# Patient Record
Sex: FEMALE | Race: WHITE | Employment: STUDENT | URBAN - NONMETROPOLITAN AREA
[De-identification: names, ages, dates, MRNs, and addresses within clinical notes are randomized per-mention and may not be internally consistent; named-entity substitution may affect disease eponyms.]

---
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
## 2024-01-12 DIAGNOSIS — R41.840 INATTENTION: ICD-10-CM

## 2024-01-12 DIAGNOSIS — F90.2 ATTENTION DEFICIT HYPERACTIVITY DISORDER, COMBINED TYPE: ICD-10-CM

## 2024-01-12 DIAGNOSIS — F41.1 GAD (GENERALIZED ANXIETY DISORDER): ICD-10-CM

## 2024-01-12 RX ORDER — METHYLPHENIDATE HYDROCHLORIDE 27 MG/1
27 TABLET ORAL DAILY
Qty: 30 TABLET | Refills: 0 | Status: SHIPPED | OUTPATIENT
Start: 2024-01-12 | End: 2024-02-11

## 2024-01-12 NOTE — TELEPHONE ENCOUNTER
Concerta was sent in for patient  Is the higher dose helping?  Has she heard anything from psychology referral I placed at last visit?

## 2024-01-12 NOTE — TELEPHONE ENCOUNTER
Recent Visits  Date Type Provider Dept   12/08/23 Office Visit Elizabeth Mixon, APRN - CNP [facility]   09/08/23 Office Visit Elizabeth Mixon, APRN - CNP [facility]   07/11/23 Office Visit Elizabeth Mixon, APRN - CNP [facility]   06/02/23 Office Visit Elizabeth Mixon, APRN - CNP [facility]   05/05/23 Office Visit Elizabeth Mixon, APRN - CNP [facility]   04/14/23 Office Visit Elizabeth Mixon, APRN - CNP [facility]   03/20/23 Office Visit Elizabeth Mixon, APRN - CNP [facility]   Showing recent visits within past 540 days with a meds authorizing provider and meeting all other requirements  Future Appointments  Date Type Provider Dept   03/11/24 Appointment Elizabeth Mixon APRN - CNP [facility]   Showing future appointments within next 150 days with a meds authorizing provider and meeting all other requirements

## 2024-02-05 DIAGNOSIS — F41.1 GAD (GENERALIZED ANXIETY DISORDER): ICD-10-CM

## 2024-02-05 RX ORDER — ESCITALOPRAM OXALATE 20 MG/1
20 TABLET ORAL DAILY
Qty: 30 TABLET | Refills: 5 | Status: SHIPPED | OUTPATIENT
Start: 2024-02-05 | End: 2024-08-03

## 2024-02-05 NOTE — TELEPHONE ENCOUNTER
Future Appointments   Date Time Provider Department Center   3/11/2024  4:00 PM Elizabeth Mixon, BERTO - CNP [facility] [place]

## 2024-03-28 DIAGNOSIS — F90.2 ATTENTION DEFICIT HYPERACTIVITY DISORDER, COMBINED TYPE: ICD-10-CM

## 2024-03-28 DIAGNOSIS — R41.840 INATTENTION: ICD-10-CM

## 2024-03-28 DIAGNOSIS — F41.1 GAD (GENERALIZED ANXIETY DISORDER): ICD-10-CM

## 2024-03-29 RX ORDER — METHYLPHENIDATE HYDROCHLORIDE 27 MG/1
27 TABLET ORAL DAILY
Qty: 30 TABLET | Refills: 0 | Status: SHIPPED | OUTPATIENT
Start: 2024-03-29 | End: 2024-04-28

## 2024-03-29 NOTE — TELEPHONE ENCOUNTER
Recent Visits  Date Type Provider Dept   12/08/23 Office Visit Elizabeth Mixon, APRN - CNP [facility]   09/08/23 Office Visit Elizabeth Mixon, APRN - CNP [facility]   07/11/23 Office Visit Elizabeth Mixon, APRN - CNP [facility]   06/02/23 Office Visit Elizabeth Mixon, APRN [facility]   05/05/23 Office Visit Elizabeth Mixon, APRN - CNP [facility]   04/14/23 Office Visit Elizabeth Mixon, APRN - CNP [facility]   03/20/23 Office Visit Elizabeth Mixon, APRN - CNP [facility]   Showing recent visits within past 540 days with a meds authorizing provider and meeting all other requirements  Future Appointments  No visits were found meeting these conditions.  Showing future appointments within next 150 days with a meds authorizing provider and meeting all other requirements

## 2024-06-25 DIAGNOSIS — F90.2 ATTENTION DEFICIT HYPERACTIVITY DISORDER, COMBINED TYPE: ICD-10-CM

## 2024-06-25 DIAGNOSIS — F41.1 GAD (GENERALIZED ANXIETY DISORDER): ICD-10-CM

## 2024-06-25 DIAGNOSIS — R41.840 INATTENTION: ICD-10-CM

## 2024-06-25 RX ORDER — METHYLPHENIDATE HYDROCHLORIDE 27 MG/1
27 TABLET ORAL DAILY
Qty: 30 TABLET | Refills: 0 | Status: CANCELLED | OUTPATIENT
Start: 2024-06-25 | End: 2024-07-25

## 2024-06-25 NOTE — TELEPHONE ENCOUNTER
Recent Visits  Date Type Provider Dept   12/08/23 Office Visit Elizabeth Mixon, APRN - CNP [facility]   09/08/23 Office Visit Elizabeth Mixon, APRN - CNP [facility]   07/11/23 Office Visit Elizabeth Mixon, APRN - CNP [facility]   06/02/23 Office Visit Elizabeth Mixon, APRN [facility]   05/05/23 Office Visit Elizabeth Mixon, APRN - CNP [facility]   04/14/23 Office Visit Elizabeth Mixon, APRN - CNP [facility]   03/20/23 Office Visit Elizabeth Mixon, APRN - CNP [facility]   Showing recent visits within past 540 days with a meds authorizing provider and meeting all other requirements  Future Appointments  No visits were found meeting these conditions.  Showing future appointments within next 150 days with a meds authorizing provider and meeting all other requirements

## 2024-06-27 RX ORDER — METHYLPHENIDATE HYDROCHLORIDE 27 MG/1
27 TABLET ORAL DAILY
Qty: 30 TABLET | Refills: 0 | Status: SHIPPED | OUTPATIENT
Start: 2024-06-27 | End: 2024-07-27

## 2024-06-27 NOTE — TELEPHONE ENCOUNTER
Left message on answering machine. Requested pt to call back  at their earliest convenience.     I have been unable to reach this patient by phone.  A letter is being sent.

## 2024-06-27 NOTE — TELEPHONE ENCOUNTER
Recent Visits  Date Type Provider Dept   12/08/23 Office Visit Elizabeth Mixon, APRN [facility]   09/08/23 Office Visit Elizabeth Mixon, APRN - CNP [facility]   07/11/23 Office Visit Elizabeth Mixon, APRN [facility]   06/02/23 Office Visit Elizabeth Mixon, APRN [facility]   05/05/23 Office Visit Elizabeth Mixon, APRN [facility]   04/14/23 Office Visit Elizabeth Mixon, APRN [facility]   03/20/23 Office Visit Elizabeth Mixon, APRN - CNP [facility]   Showing recent visits within past 540 days with a meds authorizing provider and meeting all other requirements  Future Appointments  Date Type Provider Dept   07/22/24 Appointment Elizabeth Mixon, APRN [facility]   Showing future appointments within next 150 days with a meds authorizing provider and meeting all other requirements    Pt called back and stated that she is taking concerta. Just for got to refill it

## 2024-07-20 ASSESSMENT — PATIENT HEALTH QUESTIONNAIRE - PHQ9
1. LITTLE INTEREST OR PLEASURE IN DOING THINGS: NOT AT ALL
SUM OF ALL RESPONSES TO PHQ QUESTIONS 1-9: 11
2. FEELING DOWN, DEPRESSED OR HOPELESS: NOT AT ALL
7. TROUBLE CONCENTRATING ON THINGS, SUCH AS READING THE NEWSPAPER OR WATCHING TELEVISION: NEARLY EVERY DAY
4. FEELING TIRED OR HAVING LITTLE ENERGY: MORE THAN HALF THE DAYS
6. FEELING BAD ABOUT YOURSELF - OR THAT YOU ARE A FAILURE OR HAVE LET YOURSELF OR YOUR FAMILY DOWN: NOT AT ALL
10. IF YOU CHECKED OFF ANY PROBLEMS, HOW DIFFICULT HAVE THESE PROBLEMS MADE IT FOR YOU TO DO YOUR WORK, TAKE CARE OF THINGS AT HOME, OR GET ALONG WITH OTHER PEOPLE: VERY DIFFICULT
5. POOR APPETITE OR OVEREATING: SEVERAL DAYS
9. THOUGHTS THAT YOU WOULD BE BETTER OFF DEAD, OR OF HURTING YOURSELF: NOT AT ALL
3. TROUBLE FALLING OR STAYING ASLEEP: NEARLY EVERY DAY
8. MOVING OR SPEAKING SO SLOWLY THAT OTHER PEOPLE COULD HAVE NOTICED. OR THE OPPOSITE - BEING SO FIDGETY OR RESTLESS THAT YOU HAVE BEEN MOVING AROUND A LOT MORE THAN USUAL: MORE THAN HALF THE DAYS

## 2024-07-22 ENCOUNTER — OFFICE VISIT (OUTPATIENT)
Dept: FAMILY MEDICINE CLINIC | Age: 19
End: 2024-07-22
Payer: COMMERCIAL

## 2024-07-22 VITALS
SYSTOLIC BLOOD PRESSURE: 118 MMHG | HEART RATE: 83 BPM | BODY MASS INDEX: 20.79 KG/M2 | TEMPERATURE: 97.3 F | RESPIRATION RATE: 14 BRPM | OXYGEN SATURATION: 97 % | WEIGHT: 121.8 LBS | DIASTOLIC BLOOD PRESSURE: 68 MMHG | HEIGHT: 64 IN

## 2024-07-22 DIAGNOSIS — F41.1 GAD (GENERALIZED ANXIETY DISORDER): ICD-10-CM

## 2024-07-22 DIAGNOSIS — F90.2 ATTENTION DEFICIT HYPERACTIVITY DISORDER, COMBINED TYPE: ICD-10-CM

## 2024-07-22 DIAGNOSIS — Z00.00 PHYSICAL EXAM: Primary | ICD-10-CM

## 2024-07-22 PROCEDURE — 99395 PREV VISIT EST AGE 18-39: CPT | Performed by: NURSE PRACTITIONER

## 2024-07-22 RX ORDER — ESCITALOPRAM OXALATE 20 MG/1
20 TABLET ORAL DAILY
Qty: 90 TABLET | Refills: 3 | Status: SHIPPED | OUTPATIENT
Start: 2024-07-22 | End: 2025-07-17

## 2024-07-22 RX ORDER — METHYLPHENIDATE HYDROCHLORIDE 36 MG/1
36 TABLET ORAL DAILY
Qty: 30 TABLET | Refills: 0 | Status: SHIPPED | OUTPATIENT
Start: 2024-07-22 | End: 2024-08-21

## 2024-07-22 RX ORDER — METHYLPHENIDATE HYDROCHLORIDE 27 MG/1
27 TABLET ORAL EVERY MORNING
COMMUNITY
End: 2024-07-22

## 2024-07-22 NOTE — PROGRESS NOTES
Sally Garibay is a 19 y.o. female thatpresents for Annual Exam      History obtained today from Patient.    HPI    Diet: good  Sleep: hard to fall asleep  Moods: as below    ADHD    Was started on Concerta as she wasn't feeling any difference with Adderall  Quit her job recently and is living back with her parents around the holidays. Had new coworkers and manager.  Feels her anxiety is controlled with the Lexapro. Still with issues of inattention and not being able to stay on task, even at home. She reports being diagnosed with ADHD as a kid but not treated. Still not noting any different with Concerta. She is frustrated as she feels her memory and inability to focus are preventing her from moving on and getting a job or going back to school.      She has hx of previous Suicide attempt.   Has not recently seen mental specialist. Would like to establish with someone. She will be moving back to [facility] after holidays    This visit 7-    On Concerta  Kept forgetting to get it filled went without it from March to June  On 27 mg and not sure she is noticing any difference  Agreeable to increasing dose  Work is going good, not getting in trouble. Did forget to write up reports earlier this month but nothing major  Lives with her grandmother, having health issues and family is coming to stay so that is throwing her off her routine    Anxiety  This visit 7-  On Lexapro thinks its helping with anxiety  No SI/HI  Takes Hydroxyzine 25 mg at night, if she remembers  Trouble falling asleep       Depression  PHQ9 was 11 today  Continues on Lexapro  No SI/HI    She has hx of previous Suicide attempt.   Has not recently seen mental health specialist. Would like to establish with someone.       I have reviewed the patient's past medical history, past surgical history, allergies,medications, social and family history and I have made updates where appropriate.    Past Medical History:   Diagnosis Date    Major

## 2024-07-22 NOTE — PATIENT INSTRUCTIONS
Krysta Zelaya, PHD  Address: [address], [place], [place] [id number]  Phone: [phone]

## 2024-08-24 DIAGNOSIS — F90.2 ATTENTION DEFICIT HYPERACTIVITY DISORDER, COMBINED TYPE: ICD-10-CM

## 2024-08-24 DIAGNOSIS — F41.1 GAD (GENERALIZED ANXIETY DISORDER): ICD-10-CM

## 2024-08-26 RX ORDER — METHYLPHENIDATE HYDROCHLORIDE 36 MG/1
36 TABLET ORAL DAILY
Qty: 30 TABLET | Refills: 0 | Status: SHIPPED | OUTPATIENT
Start: 2024-08-26 | End: 2024-09-25

## 2024-08-26 NOTE — TELEPHONE ENCOUNTER
Recent Visits  Date Type Provider Dept   07/22/24 Office Visit Elizabeth Mixon, APRN - CNP [facility]   12/08/23 Office Visit Elizabeth Mixon, APRN - CNP [facility]   09/08/23 Office Visit Elizabeth Mixon, APRN - CNP [facility]   07/11/23 Office Visit Elizabeth Mixon, APRN - CNP [facility]   06/02/23 Office Visit Elizabeth Mixon, APRN - CNP [facility]   05/05/23 Office Visit Elizabeth Mixon, APRN - CNP [facility]   04/14/23 Office Visit Elizabeth Mixon, APRN - CNP [facility]   03/20/23 Office Visit Elizabeth Mixon, APRN - CNP [facility]   Showing recent visits within past 540 days with a meds authorizing provider and meeting all other requirements  Future Appointments  Date Type Provider Dept   01/23/25 Appointment Elizabeth Mixon, APRN - CNP [facility]   Showing future appointments within next 150 days with a meds authorizing provider and meeting all other requirements

## 2024-09-27 DIAGNOSIS — F90.2 ATTENTION DEFICIT HYPERACTIVITY DISORDER, COMBINED TYPE: ICD-10-CM

## 2024-09-27 DIAGNOSIS — F41.1 GAD (GENERALIZED ANXIETY DISORDER): ICD-10-CM

## 2024-09-30 RX ORDER — METHYLPHENIDATE HYDROCHLORIDE 36 MG/1
36 TABLET ORAL DAILY
Qty: 30 TABLET | Refills: 0 | Status: SHIPPED | OUTPATIENT
Start: 2024-09-30 | End: 2024-10-30

## 2024-09-30 NOTE — TELEPHONE ENCOUNTER
Recent Visits  Date Type Provider Dept   07/22/24 Office Visit Elizabeth iMxon, APRN - CNP [facility]   12/08/23 Office Visit Elizabeth Mixon, APRN [facility]   09/08/23 Office Visit Elizabeth Mixon, APRN - CNP [facility]   07/11/23 Office Visit Elizabeth Mixon, APRN - CNP [facility]   06/02/23 Office Visit Elizabeth Mixon, APRN - CNP [facility]   05/05/23 Office Visit Elizabeth Mixon, APRN - CNP [facility]   04/14/23 Office Visit Elizabeth Mixon, APRN - CNP [facility]   Showing recent visits within past 540 days with a meds authorizing provider and meeting all other requirements  Future Appointments  Date Type Provider Dept   01/23/25 Appointment Elizabeth Mixon APRN - CNP [facility]   Showing future appointments within next 150 days with a meds authorizing provider and meeting all other requirements

## 2024-10-26 DIAGNOSIS — F90.2 ATTENTION DEFICIT HYPERACTIVITY DISORDER, COMBINED TYPE: ICD-10-CM

## 2024-10-26 DIAGNOSIS — F41.1 GAD (GENERALIZED ANXIETY DISORDER): ICD-10-CM

## 2024-10-28 RX ORDER — METHYLPHENIDATE HYDROCHLORIDE 36 MG/1
36 TABLET ORAL DAILY
Qty: 30 TABLET | Refills: 0 | Status: SHIPPED | OUTPATIENT
Start: 2024-10-28 | End: 2024-11-27

## 2024-10-28 NOTE — TELEPHONE ENCOUNTER
Recent Visits  Date Type Provider Dept   07/22/24 Office Visit Elizabeth Mixon, APRN [facility]   12/08/23 Office Visit Elizabeth Mixon, APRN [facility]   09/08/23 Office Visit Elizabeth Mixon, APRN [facility]   07/11/23 Office Visit Elizabeth Mixon APRN [facility]   06/02/23 Office Visit Elizabeth Mixon, APRN [facility]   Showing recent visits within past 540 days with a meds authorizing provider and meeting all other requirements  Future Appointments  Date Type Provider Dept   01/23/25 Appointment Elizabeth Mixon APRN [facility]   Showing future appointments within next 150 days with a meds authorizing provider and meeting all other requirements

## 2024-12-05 DIAGNOSIS — F90.2 ATTENTION DEFICIT HYPERACTIVITY DISORDER, COMBINED TYPE: ICD-10-CM

## 2024-12-05 DIAGNOSIS — F41.1 GAD (GENERALIZED ANXIETY DISORDER): ICD-10-CM

## 2024-12-05 RX ORDER — METHYLPHENIDATE HYDROCHLORIDE 36 MG/1
36 TABLET ORAL DAILY
Qty: 30 TABLET | Refills: 0 | Status: SHIPPED | OUTPATIENT
Start: 2024-12-05 | End: 2025-01-04

## 2024-12-05 NOTE — TELEPHONE ENCOUNTER
Recent Visits  Date Type Provider Dept   07/22/24 Office Visit Elizabeth Mixon, APRN - [facility]   12/08/23 Office Visit Elizabeth Mixon APRN - [facility]   09/08/23 Office Visit Elizabeth Mixon APRN - [facility]   07/11/23 Office Visit Elizabeth Mixon APRN - [facility]   Showing recent visits within past 540 days with a meds authorizing provider and meeting all other requirements  Future Appointments  Date Type Provider Dept   01/23/25 Appointment Elizabeth Mixon APRN - [facility]   Showing future appointments within next 150 days with a meds authorizing provider and meeting all other requirements

## 2024-12-17 ENCOUNTER — TELEPHONE (OUTPATIENT)
Dept: FAMILY MEDICINE CLINIC | Age: 19
End: 2024-12-17

## 2024-12-17 DIAGNOSIS — F41.1 GAD (GENERALIZED ANXIETY DISORDER): Primary | ICD-10-CM

## 2024-12-17 DIAGNOSIS — R41.840 INATTENTION: ICD-10-CM

## 2024-12-17 NOTE — TELEPHONE ENCOUNTER
I can send referral to [facility] and see if they reach out to her to schedule.

## 2024-12-17 NOTE — TELEPHONE ENCOUNTER
Pt's mother called stating that the patient is request a referral for a psychiatrist in the [facility].   For depression and anxiety because she feels like her medication is not working.

## 2024-12-17 NOTE — TELEPHONE ENCOUNTER
They do  not. They just wanted to know if they could be referred to anyone in [facility]. If not they are okay with seeing someone around here and she will drive home form college to go to the appointment.

## 2024-12-18 ENCOUNTER — TELEPHONE (OUTPATIENT)
Dept: FAMILY MEDICINE CLINIC | Age: 19
End: 2024-12-18

## 2024-12-18 DIAGNOSIS — F33.9 EPISODE OF RECURRENT MAJOR DEPRESSIVE DISORDER, UNSPECIFIED DEPRESSION EPISODE SEVERITY (HCC): ICD-10-CM

## 2024-12-18 DIAGNOSIS — F41.1 GAD (GENERALIZED ANXIETY DISORDER): ICD-10-CM

## 2024-12-18 DIAGNOSIS — R41.840 INATTENTION: Primary | ICD-10-CM

## 2024-12-26 ENCOUNTER — TELEPHONE (OUTPATIENT)
Dept: FAMILY MEDICINE CLINIC | Age: 19
End: 2024-12-26

## 2024-12-26 NOTE — TELEPHONE ENCOUNTER
Elizabeth Mixon APRN - CNP  P [facility]  See if psychiatry reached out to her with this below information  Electronically signed by BERTO Gomes CNP on 12/26/24 at 1:33 PM          Previous Messages       ----- Message -----  From: Saranya Correia MA  Sent: 12/23/2024  11:45 AM EST  To: BERTO Gomes CNP  Subject: Referral notification                            Good morning Clint Johnson after review of referral: Please clarify with patient if she is living in [place], [place] or [place], [place] as her address in medical records is listed as [place], [place] and previous note (although dated back to 7/2024) mentions she is moving back to [place], [place]. If she is living in [place], [place] decline referral as patient needs to establish with a provider closer to her location as appointments with me will need to be in person. If she is moving to [place], [place] and will be residing in [place] she may schedule for medication management BUT will need to be seen IN PERSON, and in addition all medications will be discussed and may or may not be continued. Would also recommend past medical records indicating dx of ADHD be brought to that appointment for review.  *Our office will reach out to clarify.    Patient was also contacted with this information.      Thank you,    Saranya Correia, [facility]

## 2024-12-26 NOTE — TELEPHONE ENCOUNTER
Left message on answering machine. Requested pt to call back at [phone], at their earliest convenience.       Per Ava, see if patient wants to establish with [facility] since they will not take a referral from outside source. They only take referrals from their PCPs

## 2024-12-27 NOTE — TELEPHONE ENCOUNTER
Pt is informed of the message below. Pt is agreeable to the [facility]. Psychiatry did reach out to let her know that the referral was denied.

## 2024-12-27 NOTE — TELEPHONE ENCOUNTER
Ok she can seek out a primary care provider within [facility], closer to where she is living and establish with them and hopefully they can refer her to [facility] for psychiatry  Please inform patient

## 2025-01-07 DIAGNOSIS — F41.1 GAD (GENERALIZED ANXIETY DISORDER): ICD-10-CM

## 2025-01-07 DIAGNOSIS — F90.2 ATTENTION DEFICIT HYPERACTIVITY DISORDER, COMBINED TYPE: ICD-10-CM

## 2025-01-07 RX ORDER — METHYLPHENIDATE HYDROCHLORIDE 36 MG/1
36 TABLET ORAL DAILY
Qty: 30 TABLET | Refills: 0 | Status: SHIPPED | OUTPATIENT
Start: 2025-01-07 | End: 2025-02-06

## 2025-01-07 NOTE — TELEPHONE ENCOUNTER
Recent Visits  Date Type Provider Dept   07/22/24 Office Visit Elizabeth Mixon APRN - [facility]NP [facility]   12/08/23 Office Visit Elizabeth Mixon APRN - [facility]   09/08/23 Office Visit Elizabeth Mixon APRN - [facility]   Showing recent visits within past 540 days with a meds authorizing provider and meeting all other requirements  Future Appointments  No visits were found meeting these conditions.  Showing future appointments within next 150 days with a meds authorizing provider and meeting all other requirements

## 2025-06-26 RX ORDER — TRAZODONE HYDROCHLORIDE 50 MG/1
50 TABLET ORAL NIGHTLY
COMMUNITY
Start: 2025-06-05

## 2025-06-27 ENCOUNTER — OFFICE VISIT (OUTPATIENT)
Dept: FAMILY MEDICINE CLINIC | Age: 20
End: 2025-06-27
Payer: COMMERCIAL

## 2025-06-27 VITALS
TEMPERATURE: 97.3 F | DIASTOLIC BLOOD PRESSURE: 66 MMHG | RESPIRATION RATE: 14 BRPM | WEIGHT: 121.8 LBS | HEART RATE: 94 BPM | SYSTOLIC BLOOD PRESSURE: 114 MMHG | HEIGHT: 64 IN | OXYGEN SATURATION: 98 % | BODY MASS INDEX: 20.79 KG/M2

## 2025-06-27 DIAGNOSIS — Z00.00 PHYSICAL EXAM: Primary | ICD-10-CM

## 2025-06-27 DIAGNOSIS — F33.9 EPISODE OF RECURRENT MAJOR DEPRESSIVE DISORDER, UNSPECIFIED DEPRESSION EPISODE SEVERITY: ICD-10-CM

## 2025-06-27 PROBLEM — F41.9 ANXIETY AND DEPRESSION: Status: ACTIVE | Noted: 2025-06-05

## 2025-06-27 PROBLEM — F32.A ANXIETY AND DEPRESSION: Status: ACTIVE | Noted: 2025-06-05

## 2025-06-27 PROCEDURE — 99395 PREV VISIT EST AGE 18-39: CPT | Performed by: NURSE PRACTITIONER

## 2025-06-27 ASSESSMENT — PATIENT HEALTH QUESTIONNAIRE - PHQ9
SUM OF ALL RESPONSES TO PHQ QUESTIONS 1-9: 20
5. POOR APPETITE OR OVEREATING: NEARLY EVERY DAY
10. IF YOU CHECKED OFF ANY PROBLEMS, HOW DIFFICULT HAVE THESE PROBLEMS MADE IT FOR YOU TO DO YOUR WORK, TAKE CARE OF THINGS AT HOME, OR GET ALONG WITH OTHER PEOPLE: VERY DIFFICULT
6. FEELING BAD ABOUT YOURSELF - OR THAT YOU ARE A FAILURE OR HAVE LET YOURSELF OR YOUR FAMILY DOWN: MORE THAN HALF THE DAYS
2. FEELING DOWN, DEPRESSED OR HOPELESS: MORE THAN HALF THE DAYS
9. THOUGHTS THAT YOU WOULD BE BETTER OFF DEAD, OR OF HURTING YOURSELF: NOT AT ALL
5. POOR APPETITE OR OVEREATING: NEARLY EVERY DAY
3. TROUBLE FALLING OR STAYING ASLEEP: NEARLY EVERY DAY
8. MOVING OR SPEAKING SO SLOWLY THAT OTHER PEOPLE COULD HAVE NOTICED. OR THE OPPOSITE - BEING SO FIDGETY OR RESTLESS THAT YOU HAVE BEEN MOVING AROUND A LOT MORE THAN USUAL: NEARLY EVERY DAY
SUM OF ALL RESPONSES TO PHQ QUESTIONS 1-9: 20
SUM OF ALL RESPONSES TO PHQ QUESTIONS 1-9: 20
7. TROUBLE CONCENTRATING ON THINGS, SUCH AS READING THE NEWSPAPER OR WATCHING TELEVISION: NEARLY EVERY DAY
10. IF YOU CHECKED OFF ANY PROBLEMS, HOW DIFFICULT HAVE THESE PROBLEMS MADE IT FOR YOU TO DO YOUR WORK, TAKE CARE OF THINGS AT HOME, OR GET ALONG WITH OTHER PEOPLE: VERY DIFFICULT
1. LITTLE INTEREST OR PLEASURE IN DOING THINGS: NEARLY EVERY DAY
SUM OF ALL RESPONSES TO PHQ QUESTIONS 1-9: 20
8. MOVING OR SPEAKING SO SLOWLY THAT OTHER PEOPLE COULD HAVE NOTICED. OR THE OPPOSITE, BEING SO FIGETY OR RESTLESS THAT YOU HAVE BEEN MOVING AROUND A LOT MORE THAN USUAL: NEARLY EVERY DAY
4. FEELING TIRED OR HAVING LITTLE ENERGY: SEVERAL DAYS
3. TROUBLE FALLING OR STAYING ASLEEP: NEARLY EVERY DAY
9. THOUGHTS THAT YOU WOULD BE BETTER OFF DEAD, OR OF HURTING YOURSELF: NOT AT ALL
7. TROUBLE CONCENTRATING ON THINGS, SUCH AS READING THE NEWSPAPER OR WATCHING TELEVISION: NEARLY EVERY DAY
1. LITTLE INTEREST OR PLEASURE IN DOING THINGS: NEARLY EVERY DAY
SUM OF ALL RESPONSES TO PHQ QUESTIONS 1-9: 20
4. FEELING TIRED OR HAVING LITTLE ENERGY: SEVERAL DAYS
6. FEELING BAD ABOUT YOURSELF - OR THAT YOU ARE A FAILURE OR HAVE LET YOURSELF OR YOUR FAMILY DOWN: MORE THAN HALF THE DAYS
2. FEELING DOWN, DEPRESSED OR HOPELESS: MORE THAN HALF THE DAYS

## 2025-06-27 ASSESSMENT — COLUMBIA-SUICIDE SEVERITY RATING SCALE - C-SSRS
6. IN YOUR LIFETIME, HAVE YOU EVER DONE ANYTHING, STARTED TO DO ANYTHING, OR PREPARED TO DO ANYTHING TO END YOUR LIFE?: NO
1. IN THE PAST MONTH, HAVE YOU WISHED YOU WERE DEAD OR WISHED YOU COULD GO TO SLEEP AND NOT WAKE UP?: NO
2. IN THE PAST MONTH, HAVE YOU ACTUALLY HAD ANY THOUGHTS OF KILLING YOURSELF?: NO

## 2025-06-27 NOTE — PROGRESS NOTES
Chief Complaint   Patient presents with    Annual Exam         SUBJECTIVE:  Sally Garibay is a 20 y.o. female for physical exam.  Seeing provider in [place]     Diet - eating ok, appetite is down eating once a day  Exercise - walks, works at [employer]  Sleep - SLEEP ISN'T GOING WELL. On trazodone but not helping. From previous provider that she saw in [place] on 6/5 to establish care  Mood - on Lexapro, feels depression is better on Trazodone.     PHQ9 was 20 today  Taking Lexapro and taking at night not causing drowsiness anymore  Taking it at 1030 pm takes her hours to fall asleep  Still has a lot of energy she doesn't know what do with all of the energy    Health Maintenance reviewed with patient today.     Past Medical History:   Diagnosis Date    Major depressive disorder with current active episode 2/12/2018       Past Surgical History:   Procedure Laterality Date    TONSILLECTOMY         Social History     Socioeconomic History    Marital status: Single     Spouse name: Not on file    Number of children: Not on file    Years of education: Not on file    Highest education level: Not on file   Occupational History    Not on file   Tobacco Use    Smoking status: Never    Smokeless tobacco: Never   Vaping Use    Vaping status: Never Used   Substance and Sexual Activity    Alcohol use: Yes    Drug use: No    Sexual activity: Never   Other Topics Concern    Not on file   Social History Narrative    Not on file     Social Drivers of Health     Financial Resource Strain: Low Risk  (10/6/2023)    Overall Financial Resource Strain (CARDIA)     Difficulty of Paying Living Expenses: Not hard at all   Food Insecurity: Not on file (10/6/2023)   Transportation Needs: Unknown (10/6/2023)    PRAPARE - Transportation     Lack of Transportation (Medical): Not on file     Lack of Transportation (Non-Medical): No   Physical Activity: Not on file   Stress: Not on file   Social Connections: Not on file   Intimate

## 2025-07-28 DIAGNOSIS — F41.1 GAD (GENERALIZED ANXIETY DISORDER): ICD-10-CM

## 2025-07-28 RX ORDER — ESCITALOPRAM OXALATE 20 MG/1
20 TABLET ORAL DAILY
Qty: 90 TABLET | Refills: 3 | Status: SHIPPED | OUTPATIENT
Start: 2025-07-28 | End: 2026-07-23